# Patient Record
Sex: FEMALE | Race: WHITE | Employment: FULL TIME | URBAN - METROPOLITAN AREA
[De-identification: names, ages, dates, MRNs, and addresses within clinical notes are randomized per-mention and may not be internally consistent; named-entity substitution may affect disease eponyms.]

---

## 2024-09-13 ENCOUNTER — HOSPITAL ENCOUNTER (OUTPATIENT)
Age: 24
Setting detail: SPECIMEN
Discharge: HOME | End: 2024-09-13
Payer: COMMERCIAL

## 2024-09-13 LAB
EST. AVERAGE GLUCOSE BLD GHB EST-MCNC: 103 MG/DL
HBA1C MFR BLD: 5.2 % (ref 4.2–6.3)

## 2024-09-13 PROCEDURE — 36415 COLL VENOUS BLD VENIPUNCTURE: CPT

## 2024-09-13 PROCEDURE — 83036 HEMOGLOBIN GLYCOSYLATED A1C: CPT

## 2025-02-19 NOTE — PROGRESS NOTES
Neurology Service Consult Note  [facility] SPECIALTY   Patient Name: Kym Romero  : 2000        Subjective:   Reason for consult:   25 y.o.  female with pertinent past medical history of ADHD, anxiety/depression being seen in neurologic consultation for headache.    She states that she has had headaches since she was 5 years old.   She tells me that both her mother and maternal Grandfather have migraines.     She describes the headache as \" her skull is going to detach so her brain can come out\". Headache is throbbing and can be either side but is usually unilateral in location. There is associated photo/phonophobia. They admit to nausea but rare emesis. These always last >4 hours and typically last 24-48 hours. They can last up to a week at a time.     She does admit to aura. This occurs preceding her migraine. She will get \"black and white almanzar\" in her vision. She denies numbness or paresthesias. She does get generally weak. She feels cognitively impacted with these and this can last for days following the headaches.     She does get a prominent impending sense of doom preceding the migraine.     They are impacted by headache 10-15 /30 days, of which 8-10 are migraine. She reports on days it is not a migraine she has a dull achey headache. She can have light sensitivity with these.     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 She notes when she gets a migraine on this side she will have swelling of the hemangioma. She also had a head trauma on the left side.     She does admit that she will also have stress and anxiety \"ticks\". She states she is awaiting Fontacto

## 2025-02-20 ENCOUNTER — OFFICE VISIT (OUTPATIENT)
Age: 25
End: 2025-02-20

## 2025-02-20 VITALS
OXYGEN SATURATION: 99 % | WEIGHT: 216.8 LBS | HEART RATE: 91 BPM | DIASTOLIC BLOOD PRESSURE: 78 MMHG | SYSTOLIC BLOOD PRESSURE: 124 MMHG

## 2025-02-20 DIAGNOSIS — G43.101 MIGRAINE WITH AURA AND WITH STATUS MIGRAINOSUS, NOT INTRACTABLE: Primary | ICD-10-CM

## 2025-02-20 DIAGNOSIS — F95.8 MOTOR TIC DISORDER: ICD-10-CM

## 2025-02-20 RX ORDER — ATOGEPANT 60 MG/1
TABLET ORAL
Qty: 8 TABLET | Refills: 0 | Status: SHIPPED | COMMUNITY
Start: 2025-02-20

## 2025-02-20 RX ORDER — ATOGEPANT 60 MG/1
60 TABLET ORAL DAILY
Qty: 30 TABLET | Refills: 11 | Status: SHIPPED | OUTPATIENT
Start: 2025-02-20 | End: 2026-02-20

## 2025-02-20 RX ORDER — RIZATRIPTAN BENZOATE 10 MG/1
10 TABLET ORAL AS NEEDED
Qty: 15 TABLET | Refills: 2 | Status: SHIPPED | OUTPATIENT
Start: 2025-02-20